# Patient Record
Sex: FEMALE | Race: WHITE | NOT HISPANIC OR LATINO | Employment: UNEMPLOYED | URBAN - METROPOLITAN AREA
[De-identification: names, ages, dates, MRNs, and addresses within clinical notes are randomized per-mention and may not be internally consistent; named-entity substitution may affect disease eponyms.]

---

## 2022-06-07 ENCOUNTER — HOSPITAL ENCOUNTER (OUTPATIENT)
Dept: RADIOLOGY | Facility: HOSPITAL | Age: 42
Discharge: HOME | End: 2022-06-07
Attending: FAMILY MEDICINE
Payer: MEDICAID

## 2022-06-07 DIAGNOSIS — Z12.31 ENCOUNTER FOR SCREENING MAMMOGRAM FOR MALIGNANT NEOPLASM OF BREAST: ICD-10-CM

## 2022-06-07 PROCEDURE — 77063 BREAST TOMOSYNTHESIS BI: CPT | Mod: 26,,, | Performed by: RADIOLOGY

## 2022-06-07 PROCEDURE — 77067 SCR MAMMO BI INCL CAD: CPT | Mod: 26,,, | Performed by: RADIOLOGY

## 2022-06-07 PROCEDURE — 77067 MAMMO DIGITAL SCREENING BILAT WITH TOMO: ICD-10-PCS | Mod: 26,,, | Performed by: RADIOLOGY

## 2022-06-07 PROCEDURE — 77063 MAMMO DIGITAL SCREENING BILAT WITH TOMO: ICD-10-PCS | Mod: 26,,, | Performed by: RADIOLOGY

## 2022-06-07 PROCEDURE — 77067 SCR MAMMO BI INCL CAD: CPT | Mod: TC

## 2022-06-16 ENCOUNTER — HOSPITAL ENCOUNTER (OUTPATIENT)
Dept: RADIOLOGY | Facility: HOSPITAL | Age: 42
Discharge: HOME | End: 2022-06-16
Attending: FAMILY MEDICINE
Payer: MEDICAID

## 2022-06-16 DIAGNOSIS — R92.8 ABNORMAL MAMMOGRAM: ICD-10-CM

## 2022-06-16 PROCEDURE — 77061 BREAST TOMOSYNTHESIS UNI: CPT | Mod: 26,LT,, | Performed by: RADIOLOGY

## 2022-06-16 PROCEDURE — 77061 BREAST TOMOSYNTHESIS UNI: CPT | Mod: TC,LT

## 2022-06-16 PROCEDURE — 76642 ULTRASOUND BREAST LIMITED: CPT | Mod: TC,LT

## 2022-06-16 PROCEDURE — 77061 MAMMO DIGITAL DIAGNOSTIC LEFT WITH TOMO: ICD-10-PCS | Mod: 26,LT,, | Performed by: RADIOLOGY

## 2022-06-16 PROCEDURE — 77065 MAMMO DIGITAL DIAGNOSTIC LEFT WITH TOMO: ICD-10-PCS | Mod: 26,LT,, | Performed by: RADIOLOGY

## 2022-06-16 PROCEDURE — 77065 DX MAMMO INCL CAD UNI: CPT | Mod: TC,LT

## 2022-06-16 PROCEDURE — 76642 US BREAST LEFT LIMITED: ICD-10-PCS | Mod: 26,LT,, | Performed by: RADIOLOGY

## 2022-06-16 PROCEDURE — 76642 ULTRASOUND BREAST LIMITED: CPT | Mod: 26,LT,, | Performed by: RADIOLOGY

## 2022-06-16 PROCEDURE — 77065 DX MAMMO INCL CAD UNI: CPT | Mod: 26,LT,, | Performed by: RADIOLOGY

## 2023-02-28 ENCOUNTER — HOSPITAL ENCOUNTER (OUTPATIENT)
Dept: RADIOLOGY | Facility: HOSPITAL | Age: 43
Discharge: HOME | End: 2023-02-28
Attending: FAMILY MEDICINE
Payer: MEDICAID

## 2023-02-28 DIAGNOSIS — M54.50 LOW BACK PAIN: ICD-10-CM

## 2023-02-28 PROCEDURE — 72100 X-RAY EXAM L-S SPINE 2/3 VWS: CPT | Mod: TC

## 2023-06-12 DIAGNOSIS — Z12.31 OTHER SCREENING MAMMOGRAM: Primary | ICD-10-CM

## 2023-06-14 ENCOUNTER — HOSPITAL ENCOUNTER (OUTPATIENT)
Dept: RADIOLOGY | Facility: HOSPITAL | Age: 43
Discharge: HOME | End: 2023-06-14
Attending: FAMILY MEDICINE
Payer: MEDICAID

## 2023-06-14 DIAGNOSIS — Z12.31 OTHER SCREENING MAMMOGRAM: ICD-10-CM

## 2023-06-14 PROCEDURE — 77067 SCR MAMMO BI INCL CAD: CPT | Mod: TC

## 2023-06-14 PROCEDURE — 77063 BREAST TOMOSYNTHESIS BI: CPT | Mod: 26,,, | Performed by: STUDENT IN AN ORGANIZED HEALTH CARE EDUCATION/TRAINING PROGRAM

## 2023-06-14 PROCEDURE — 77067 MAMMO DIGITAL SCREENING BILAT WITH TOMO: ICD-10-PCS | Mod: 26,,, | Performed by: STUDENT IN AN ORGANIZED HEALTH CARE EDUCATION/TRAINING PROGRAM

## 2023-06-14 PROCEDURE — 77067 SCR MAMMO BI INCL CAD: CPT | Mod: 26,,, | Performed by: STUDENT IN AN ORGANIZED HEALTH CARE EDUCATION/TRAINING PROGRAM

## 2023-06-14 PROCEDURE — 77063 MAMMO DIGITAL SCREENING BILAT WITH TOMO: ICD-10-PCS | Mod: 26,,, | Performed by: STUDENT IN AN ORGANIZED HEALTH CARE EDUCATION/TRAINING PROGRAM

## 2024-03-25 ENCOUNTER — HOSPITAL ENCOUNTER (EMERGENCY)
Facility: HOSPITAL | Age: 44
Discharge: HOME | End: 2024-03-26
Attending: INTERNAL MEDICINE
Payer: MEDICAID

## 2024-03-25 DIAGNOSIS — R10.9 LEFT FLANK PAIN: ICD-10-CM

## 2024-03-25 DIAGNOSIS — N20.1 URETEROLITHIASIS: Primary | ICD-10-CM

## 2024-03-25 PROCEDURE — 99285 EMERGENCY DEPT VISIT HI MDM: CPT

## 2024-03-26 VITALS
DIASTOLIC BLOOD PRESSURE: 92 MMHG | SYSTOLIC BLOOD PRESSURE: 120 MMHG | OXYGEN SATURATION: 100 % | TEMPERATURE: 98 F | BODY MASS INDEX: 38.31 KG/M2 | HEIGHT: 62 IN | WEIGHT: 208.19 LBS | RESPIRATION RATE: 16 BRPM | HEART RATE: 78 BPM

## 2024-03-26 LAB
AMPHET UR QL SCN: NEGATIVE
APPEARANCE UR: CLEAR
B-HCG SERPL QL: NEGATIVE
BACTERIA #/AREA URNS AUTO: ABNORMAL /HPF
BARBITURATE SCN PRESENT UR: NEGATIVE
BENZODIAZ UR QL SCN: NEGATIVE
BILIRUB UR QL STRIP.AUTO: NEGATIVE
CANNABINOIDS UR QL SCN: NEGATIVE
COCAINE UR QL SCN: NEGATIVE
COLOR UR AUTO: YELLOW
FENTANYL UR QL SCN: NEGATIVE
GLUCOSE UR QL STRIP.AUTO: NEGATIVE
KETONES UR QL STRIP.AUTO: ABNORMAL
LEUKOCYTE ESTERASE UR QL STRIP.AUTO: ABNORMAL
MDMA UR QL SCN: NEGATIVE
NITRITE UR QL STRIP.AUTO: NEGATIVE
OPIATES UR QL SCN: NEGATIVE
PCP UR QL: NEGATIVE
PH UR STRIP.AUTO: 6 [PH]
PH UR: 6 [PH] (ref 3–11)
PROT UR QL STRIP.AUTO: NEGATIVE
RBC #/AREA URNS AUTO: ABNORMAL /HPF
RBC UR QL AUTO: ABNORMAL
SP GR UR STRIP.AUTO: >=1.03 (ref 1–1.03)
SPECIFIC GRAVITY, URINE AUTO (.000) (OHS): >=1.03 (ref 1–1.03)
SQUAMOUS #/AREA URNS AUTO: ABNORMAL /HPF
UROBILINOGEN UR STRIP-ACNC: 0.2
WBC #/AREA URNS AUTO: ABNORMAL /HPF

## 2024-03-26 PROCEDURE — 96372 THER/PROPH/DIAG INJ SC/IM: CPT | Performed by: INTERNAL MEDICINE

## 2024-03-26 PROCEDURE — 81025 URINE PREGNANCY TEST: CPT | Performed by: INTERNAL MEDICINE

## 2024-03-26 PROCEDURE — 80307 DRUG TEST PRSMV CHEM ANLYZR: CPT | Performed by: INTERNAL MEDICINE

## 2024-03-26 PROCEDURE — 81003 URINALYSIS AUTO W/O SCOPE: CPT | Performed by: INTERNAL MEDICINE

## 2024-03-26 PROCEDURE — 63600175 PHARM REV CODE 636 W HCPCS: Performed by: INTERNAL MEDICINE

## 2024-03-26 RX ORDER — BETAMETHASONE SODIUM PHOSPHATE AND BETAMETHASONE ACETATE 3; 3 MG/ML; MG/ML
6 INJECTION, SUSPENSION INTRA-ARTICULAR; INTRALESIONAL; INTRAMUSCULAR; SOFT TISSUE
Status: COMPLETED | OUTPATIENT
Start: 2024-03-26 | End: 2024-03-26

## 2024-03-26 RX ORDER — TAMSULOSIN HYDROCHLORIDE 0.4 MG/1
0.4 CAPSULE ORAL DAILY
Qty: 10 CAPSULE | Refills: 0 | Status: SHIPPED | OUTPATIENT
Start: 2024-03-26 | End: 2024-04-05

## 2024-03-26 RX ORDER — OXYCODONE AND ACETAMINOPHEN 10; 325 MG/1; MG/1
1 TABLET ORAL EVERY 6 HOURS PRN
Qty: 20 TABLET | Refills: 0 | Status: SHIPPED | OUTPATIENT
Start: 2024-03-26 | End: 2024-03-31

## 2024-03-26 RX ORDER — KETOROLAC TROMETHAMINE 30 MG/ML
60 INJECTION, SOLUTION INTRAMUSCULAR; INTRAVENOUS
Status: COMPLETED | OUTPATIENT
Start: 2024-03-26 | End: 2024-03-26

## 2024-03-26 RX ADMIN — BETAMETHASONE SODIUM PHOSPHATE AND BETAMETHASONE ACETATE 6 MG: 3; 3 INJECTION, SUSPENSION INTRA-ARTICULAR; INTRALESIONAL; INTRAMUSCULAR at 12:03

## 2024-03-26 RX ADMIN — KETOROLAC TROMETHAMINE 60 MG: 60 INJECTION, SOLUTION INTRAMUSCULAR at 12:03

## 2024-03-26 NOTE — ED PROVIDER NOTES
Encounter Date: 3/25/2024       History     Chief Complaint   Patient presents with    Back Pain     C/o left lower back/hip pain that shoots up into her left shoulder for 1 hour     43-year-old white female with left flank pain going from her left flank up to her shoulder and down into the groin area on the left.  It woke her up from sleep she denies history of kidney stones she does have a history of sciatica      Review of patient's allergies indicates:  Not on File  History reviewed. No pertinent past medical history.  History reviewed. No pertinent surgical history.  History reviewed. No pertinent family history.  Social History     Tobacco Use    Smoking status: Every Day     Types: Cigarettes    Smokeless tobacco: Never   Substance Use Topics    Alcohol use: Not Currently    Drug use: Not Currently     Review of Systems   Constitutional: Negative.  Negative for activity change, appetite change, chills, diaphoresis, fatigue, fever and unexpected weight change.   HENT: Negative.  Negative for congestion, dental problem, drooling, ear discharge, ear pain, facial swelling, hearing loss, mouth sores, nosebleeds, postnasal drip, rhinorrhea, sinus pressure, sinus pain, sneezing, sore throat, tinnitus, trouble swallowing and voice change.    Eyes: Negative.  Negative for photophobia, pain, discharge, redness, itching and visual disturbance.   Respiratory: Negative.  Negative for apnea, cough, choking, chest tightness, shortness of breath, wheezing and stridor.    Cardiovascular: Negative.  Negative for chest pain, palpitations and leg swelling.   Gastrointestinal: Negative.  Negative for abdominal distention, abdominal pain, anal bleeding, blood in stool, constipation, diarrhea, nausea, rectal pain and vomiting.   Endocrine: Negative.  Negative for cold intolerance, heat intolerance, polydipsia, polyphagia and polyuria.   Genitourinary:  Positive for flank pain. Negative for decreased urine volume, difficulty  urinating, dyspareunia, dysuria, enuresis, frequency, genital sores, hematuria, menstrual problem, pelvic pain, urgency, vaginal bleeding, vaginal discharge and vaginal pain.   Musculoskeletal:  Positive for back pain. Negative for arthralgias, gait problem, joint swelling, myalgias, neck pain and neck stiffness.   Skin: Negative.  Negative for color change, pallor, rash and wound.   Allergic/Immunologic: Negative.  Negative for environmental allergies, food allergies and immunocompromised state.   Neurological: Negative.  Negative for dizziness, tremors, seizures, syncope, facial asymmetry, speech difficulty, weakness, light-headedness, numbness and headaches.   Hematological: Negative.  Negative for adenopathy. Does not bruise/bleed easily.   Psychiatric/Behavioral: Negative.  Negative for agitation, behavioral problems, confusion, decreased concentration, dysphoric mood, hallucinations, self-injury, sleep disturbance and suicidal ideas. The patient is not nervous/anxious and is not hyperactive.    All other systems reviewed and are negative.      Physical Exam     Initial Vitals [03/25/24 2357]   BP Pulse Resp Temp SpO2   (!) 140/85 81 (!) 22 98.1 °F (36.7 °C) 100 %      MAP       --         Physical Exam    Nursing note and vitals reviewed.  Constitutional: She appears well-developed and well-nourished. She is not diaphoretic. No distress.   Appears to be in pain   HENT:   Head: Normocephalic and atraumatic.   Mouth/Throat: Oropharynx is clear and moist. No oropharyngeal exudate.   Eyes: Conjunctivae and EOM are normal. No scleral icterus.   Neck: Neck supple. No JVD present.   Normal range of motion.  Cardiovascular:  Normal rate, regular rhythm, normal heart sounds and intact distal pulses.     Exam reveals no gallop and no friction rub.       No murmur heard.  Pulmonary/Chest: Breath sounds normal. No respiratory distress. She has no wheezes. She exhibits no tenderness.   Abdominal: Abdomen is soft. Bowel  sounds are normal. She exhibits no distension. There is no abdominal tenderness. There is no rebound.   Musculoskeletal:         General: Normal range of motion.      Cervical back: Normal range of motion and neck supple.     Neurological: She is alert and oriented to person, place, and time.   Skin: Skin is warm and dry. Capillary refill takes less than 2 seconds.   Psychiatric: She has a normal mood and affect. Her behavior is normal. Judgment and thought content normal.         ED Course   Procedures  Labs Reviewed   URINALYSIS, REFLEX TO URINE CULTURE - Abnormal; Notable for the following components:       Result Value    Ketones, UA 2+ (*)     Blood, UA 3+ (*)     Leukocyte Esterase, UA 1+ (*)     All other components within normal limits   URINALYSIS, MICROSCOPIC - Abnormal; Notable for the following components:    Bacteria, UA Few (*)     RBC, UA 11-20 (*)     Squamous Epithelial Cells, UA Moderate (*)     All other components within normal limits   PREGNANCY TEST, URINE RAPID - Normal   DRUG SCREEN, URINE (BEAKER) - Normal    Narrative:     Cut off concentrations:    Amphetamines - 1000 ng/ml  Barbiturates - 200 ng/ml  Benzodiazepine - 200 ng/ml  Cannabinoids (THC) - 50 ng/ml  Cocaine - 300 ng/ml  Fentanyl - 1.0 ng/ml  MDMA - 500 ng/ml  Opiates - 300 ng/ml   Phencyclidine (PCP) - 25 ng/ml    Specimen submitted for drug analysis and tested for pH and specific gravity in order to evaluate sample integrity. Suspect tampering if specific gravity is <1.003 and/or pH is not within the range of 4.5 - 8.0  False negatives may result form substances such as bleach added to urine.  False positives may result for the presence of a substance with similar chemical structure to the drug or its metabolite.    This test provides only a PRELIMINARY analytical test result. A more specific alternate chemical method must be used in order to obtain a confirmed analytical result. Gas chromatography/mass spectrometry (GC/MS) is  the preferred confirmatory method. Other chemical confirmation methods are available. Clinical consideration and professional judgement should be applied to any drug of abuse test result, particularly when preliminary positive results are used.    Positive results will be confirmed only at the physicians request. Unconfirmed screening results are to be used only for medical purposes (treatment).               Imaging Results              CT Renal Stone Study ABD Pelvis WO (Preliminary result)  Result time 03/26/24 01:20:12      Preliminary result by Juan Luis Lopez MD (03/26/24 01:20:12)                   Narrative:    START OF REPORT:  Technique: CT of the abdomen and pelvis was performed with axial images as well as sagittal and coronal reconstruction images without intravenous contrast.    Comparison: None available.    Clinical History: Flank pain. Left flank pain with hematuria.    Dosage Information: Automated Exposure Control was utilized 434.77 mGy.cm.    Findings:  Thorax:  Lungs: The visualized lung bases appear unremarkable.  Pleura: No effusions or thickening are seen.  Heart: The heart size is within normal limits.  Abdomen:  Abdominal Wall: There are tiny defects along the midline ventral mid abdominal wall / supraumbilical region with herniation of mesenteric fat (series 2, images 52-58).  Liver: The liver appears unremarkable.  Biliary System: No intrahepatic or extrahepatic biliary duct dilatation is seen.  Gallbladder: The gallbladder appears unremarkable.  Pancreas: The pancreas appears unremarkable.  Spleen: The spleen appears unremarkable.  Adrenals: The adrenal glands appear unremarkable.  Kidneys: The right kidney appears unremarkable with no stones cysts masses or hydronephrosis. Multiple nonobstructing bilateral renal calculi are seen in the mid pole of the right kidney and lower pole of the left kidney, the largest measures approximately 4 mm on the right. There is mild left  hydroureteronephrosis secondary to a 4 mm obstructing calculus in the terminal ureter (series 2, image 126).  Aorta: There is mild calcification of the abdominal aorta and its branches.  IVC: Unremarkable.  Bowel:  Esophagus: The visualized esophagus appears unremarkable.  Stomach: The stomach appears unremarkable.  Duodenum: Unremarkable appearing duodenum.  Small Bowel: The small bowel appears unremarkable.  Colon: Nondistended.  Appendix: The appendix appears unremarkable (series 2, images ).  Peritoneum: No intraperitoneal free air or ascites is seen.    Pelvis:  Bladder: The bladder is nondistended and cannot be definitively evaluated.  Female:  Uterus: The uterus appears unremarkable.  Ovaries: No adnexal masses are seen.    Bony structures:  Dorsal Spine: There is mild spondylosis of the visualized dorsal spine.  Bony Pelvis: The visualized bony structures of the pelvis appear unremarkable.      Impression:  1. There is mild left hydroureteronephrosis secondary to a 4 mm obstructing calculus in the terminal ureter (series 2, image 126). Follow-up as clinically indicated.  2. Details and other findings as discussed above.                                         Medications   ketorolac injection 60 mg (60 mg Intramuscular Given 3/26/24 0037)   betamethasone acetate-betamethasone sodium phosphate injection 6 mg (6 mg Intramuscular Given 3/26/24 0037)     Medical Decision Making  43-year-old white female with left flank pain going up to the left shoulder and down to left groin.  Differential diagnosis includes sciatica, back strain, musculoskeletal pain, diverticulitis, nephro lithiasis, ureterolithiasis, pyelonephritis.  Workup included urine which showed bloody urine at that point her exam was concerned that she probably had a kidney stone she could not keep still can not find a comfortable position so she was given some Toradol and Celestone as she reported she has a history of sciatica in the past.   Once her urine came back and showed some red blood cells in urine a CT with stone protocol was done which showed a 4 mm stone in the left distal ureter with mild hydronephrosis.  She was much more comfortable after the Toradol discussed treatment with pain meds and Flomax and she will follow up with the primary care if she does not passed her stone she can get referred to Urology    Problems Addressed:  Left flank pain: acute illness or injury with systemic symptoms  Ureterolithiasis: acute illness or injury with systemic symptoms    Amount and/or Complexity of Data Reviewed  Labs: ordered. Decision-making details documented in ED Course.  Radiology: ordered. Decision-making details documented in ED Course.    Risk  OTC drugs.  Prescription drug management.                                      Clinical Impression:  Final diagnoses:  [N20.1] Ureterolithiasis (Primary)  [R10.9] Left flank pain          ED Disposition Condition    Discharge Stable          ED Prescriptions       Medication Sig Dispense Start Date End Date Auth. Provider    oxyCODONE-acetaminophen (PERCOCET)  mg per tablet Take 1 tablet by mouth every 6 (six) hours as needed for Pain. 20 tablet 3/26/2024 3/31/2024 Tremaine Browning MD    tamsulosin (FLOMAX) 0.4 mg Cap Take 1 capsule (0.4 mg total) by mouth once daily. for 10 days 10 capsule 3/26/2024 4/5/2024 Tremaine Browning MD          Follow-up Information       Follow up With Specialties Details Why Contact Info    Duong Wiseman MD Family Medicine In 2 days  [address]  [address]  [place] [address] [phone]              Rubi Armijo is a certified MA and was present during the entire interaction with this patient      Tremaine Browning MD  03/26/24 6762

## 2024-08-12 ENCOUNTER — LAB VISIT (OUTPATIENT)
Dept: LAB | Facility: HOSPITAL | Age: 44
End: 2024-08-12
Attending: FAMILY MEDICINE
Payer: MEDICAID

## 2024-08-12 DIAGNOSIS — Z11.3 SCREENING EXAMINATION FOR VENEREAL DISEASE: Primary | ICD-10-CM

## 2024-08-12 PROCEDURE — 86803 HEPATITIS C AB TEST: CPT

## 2024-08-12 PROCEDURE — 86694 HERPES SIMPLEX NES ANTBDY: CPT

## 2024-08-12 PROCEDURE — 87591 N.GONORRHOEAE DNA AMP PROB: CPT

## 2024-08-12 PROCEDURE — 87340 HEPATITIS B SURFACE AG IA: CPT

## 2024-08-12 PROCEDURE — 86704 HEP B CORE ANTIBODY TOTAL: CPT

## 2024-08-12 PROCEDURE — 87491 CHLMYD TRACH DNA AMP PROBE: CPT

## 2024-08-12 PROCEDURE — 86708 HEPATITIS A ANTIBODY: CPT

## 2024-08-12 PROCEDURE — 86780 TREPONEMA PALLIDUM: CPT

## 2024-08-12 PROCEDURE — 87389 HIV-1 AG W/HIV-1&-2 AB AG IA: CPT

## 2024-08-12 PROCEDURE — 36415 COLL VENOUS BLD VENIPUNCTURE: CPT

## 2024-08-13 LAB
C TRACH DNA SPEC QL NAA+PROBE: NOT DETECTED
HAV AB SER QL IA: NONREACTIVE
HBV CORE AB SERPL QL IA: NONREACTIVE
HBV SURFACE AG SERPL QL IA: NONREACTIVE
HCV AB SERPL QL IA: NONREACTIVE
HIV 1+2 AB+HIV1 P24 AG SERPL QL IA: NONREACTIVE
N GONORRHOEA DNA SPEC QL NAA+PROBE: NOT DETECTED
SOURCE (OHS): NORMAL
T PALLIDUM AB SER QL: NONREACTIVE

## 2024-08-16 LAB — BEAKER SEE SCANNED REPORT: NORMAL

## 2024-12-02 ENCOUNTER — HOSPITAL ENCOUNTER (EMERGENCY)
Facility: HOSPITAL | Age: 44
Discharge: HOME | End: 2024-12-02
Attending: INTERNAL MEDICINE
Payer: MEDICAID

## 2024-12-02 VITALS
BODY MASS INDEX: 38.83 KG/M2 | TEMPERATURE: 99 F | HEIGHT: 62 IN | WEIGHT: 211 LBS | HEART RATE: 88 BPM | SYSTOLIC BLOOD PRESSURE: 148 MMHG | RESPIRATION RATE: 18 BRPM | DIASTOLIC BLOOD PRESSURE: 82 MMHG | OXYGEN SATURATION: 98 %

## 2024-12-02 DIAGNOSIS — N23 URETERAL COLIC: ICD-10-CM

## 2024-12-02 DIAGNOSIS — N20.0 NEPHROLITHIASIS: Primary | ICD-10-CM

## 2024-12-02 LAB
ALBUMIN SERPL-MCNC: 4.4 G/DL (ref 3.5–5)
ALBUMIN/GLOB SERPL: 1.3 RATIO (ref 1.1–2)
ALP SERPL-CCNC: 55 UNIT/L (ref 40–150)
ALT SERPL-CCNC: 16 UNIT/L (ref 0–55)
ANION GAP SERPL CALC-SCNC: 9 MEQ/L
AST SERPL-CCNC: 13 UNIT/L (ref 5–34)
B-HCG UR QL: NEGATIVE
BACTERIA #/AREA URNS AUTO: ABNORMAL /HPF
BASOPHILS # BLD AUTO: 0.03 X10(3)/MCL
BASOPHILS NFR BLD AUTO: 0.5 %
BILIRUB SERPL-MCNC: 0.4 MG/DL
BILIRUB UR QL STRIP.AUTO: NEGATIVE
BUN SERPL-MCNC: 9 MG/DL (ref 7–18.7)
CALCIUM SERPL-MCNC: 9.9 MG/DL (ref 8.4–10.2)
CHLORIDE SERPL-SCNC: 107 MMOL/L (ref 98–107)
CLARITY UR: ABNORMAL
CO2 SERPL-SCNC: 23 MMOL/L (ref 22–29)
COLOR UR AUTO: ABNORMAL
CREAT SERPL-MCNC: 0.74 MG/DL (ref 0.55–1.02)
CREAT/UREA NIT SERPL: 12
EOSINOPHIL # BLD AUTO: 0.13 X10(3)/MCL (ref 0–0.9)
EOSINOPHIL NFR BLD AUTO: 2 %
ERYTHROCYTE [DISTWIDTH] IN BLOOD BY AUTOMATED COUNT: 13 % (ref 11.5–17)
GFR SERPLBLD CREATININE-BSD FMLA CKD-EPI: >60 ML/MIN/1.73/M2
GLOBULIN SER-MCNC: 3.4 GM/DL (ref 2.4–3.5)
GLUCOSE SERPL-MCNC: 107 MG/DL (ref 74–100)
GLUCOSE UR QL STRIP: NEGATIVE
HCT VFR BLD AUTO: 43.1 % (ref 37–47)
HGB BLD-MCNC: 14.4 G/DL (ref 12–16)
HGB UR QL STRIP: ABNORMAL
IMM GRANULOCYTES # BLD AUTO: 0.01 X10(3)/MCL (ref 0–0.04)
IMM GRANULOCYTES NFR BLD AUTO: 0.2 %
KETONES UR QL STRIP: NEGATIVE
LEUKOCYTE ESTERASE UR QL STRIP: ABNORMAL
LIPASE SERPL-CCNC: 15 U/L
LYMPHOCYTES # BLD AUTO: 2.1 X10(3)/MCL (ref 0.6–4.6)
LYMPHOCYTES NFR BLD AUTO: 33 %
MCH RBC QN AUTO: 29.9 PG (ref 27–31)
MCHC RBC AUTO-ENTMCNC: 33.4 G/DL (ref 33–36)
MCV RBC AUTO: 89.6 FL (ref 80–94)
MONOCYTES # BLD AUTO: 0.53 X10(3)/MCL (ref 0.1–1.3)
MONOCYTES NFR BLD AUTO: 8.3 %
NEUTROPHILS # BLD AUTO: 3.56 X10(3)/MCL (ref 2.1–9.2)
NEUTROPHILS NFR BLD AUTO: 56 %
NITRITE UR QL STRIP: NEGATIVE
NRBC BLD AUTO-RTO: 0 %
PH UR STRIP: 5.5 [PH]
PLATELET # BLD AUTO: 323 X10(3)/MCL (ref 130–400)
PMV BLD AUTO: 9.9 FL (ref 7.4–10.4)
POTASSIUM SERPL-SCNC: 4 MMOL/L (ref 3.5–5.1)
PROT SERPL-MCNC: 7.8 GM/DL (ref 6.4–8.3)
PROT UR QL STRIP: NEGATIVE
RBC # BLD AUTO: 4.81 X10(6)/MCL (ref 4.2–5.4)
RBC #/AREA URNS AUTO: ABNORMAL /HPF
SODIUM SERPL-SCNC: 139 MMOL/L (ref 136–145)
SP GR UR STRIP.AUTO: 1.01 (ref 1–1.03)
SQUAMOUS #/AREA URNS AUTO: ABNORMAL /HPF
UROBILINOGEN UR STRIP-ACNC: 0.2
WBC # BLD AUTO: 6.36 X10(3)/MCL (ref 4.5–11.5)
WBC #/AREA URNS AUTO: ABNORMAL /HPF

## 2024-12-02 PROCEDURE — 81025 URINE PREGNANCY TEST: CPT | Performed by: INTERNAL MEDICINE

## 2024-12-02 PROCEDURE — 85025 COMPLETE CBC W/AUTO DIFF WBC: CPT | Performed by: INTERNAL MEDICINE

## 2024-12-02 PROCEDURE — 99284 EMERGENCY DEPT VISIT MOD MDM: CPT | Mod: 25

## 2024-12-02 PROCEDURE — 83690 ASSAY OF LIPASE: CPT | Performed by: INTERNAL MEDICINE

## 2024-12-02 PROCEDURE — 80053 COMPREHEN METABOLIC PANEL: CPT | Performed by: INTERNAL MEDICINE

## 2024-12-02 PROCEDURE — 81003 URINALYSIS AUTO W/O SCOPE: CPT | Performed by: INTERNAL MEDICINE

## 2024-12-02 RX ORDER — NITROFURANTOIN 25; 75 MG/1; MG/1
100 CAPSULE ORAL 2 TIMES DAILY
Qty: 20 CAPSULE | Refills: 0 | Status: SHIPPED | OUTPATIENT
Start: 2024-12-02 | End: 2024-12-12

## 2024-12-02 RX ORDER — TAMSULOSIN HYDROCHLORIDE 0.4 MG/1
0.4 CAPSULE ORAL DAILY
Qty: 10 CAPSULE | Refills: 0 | Status: SHIPPED | OUTPATIENT
Start: 2024-12-02 | End: 2025-12-02

## 2024-12-02 RX ORDER — HYDROCODONE BITARTRATE AND ACETAMINOPHEN 5; 325 MG/1; MG/1
1 TABLET ORAL EVERY 8 HOURS PRN
Qty: 6 TABLET | Refills: 0 | Status: SHIPPED | OUTPATIENT
Start: 2024-12-02 | End: 2024-12-04

## 2024-12-02 NOTE — ED PROVIDER NOTES
12/02/2024     9:42 AM  Source of History:  History obtained from the patient.     Chief complaint:  From Nurse Triage:  Flank Pain (C/o right flank pain since Saturday, hx kidney stones)    HISTORY OF PRESENT ILLNES:  Adelia Laws is a 44 y.o. female  has a past medical history of Kidney stone. presenting with Flank Pain (C/o right flank pain since Saturday, hx kidney stones)    REVIEW OF SYSTEMS:   Constitutional symptoms:  No Fever. No Chills    Skin symptoms:  No Rash.    Eye symptoms:  No Visual disturbance reported.   ENMT symptoms:  No Sore throat,    Respiratory symptoms:  No Shortness of Breath, no Cough, no Wheezing.    Cardiovascular symptoms:  No Chest Pain, No Palpitations.   Gastrointestinal symptoms:  Right lower Abdominal Pain, right flank pain, No Nausea, No Vomiting, No Diarrhea, No Constipation.    Genitourinary symptoms:  No Dysuria,    Musculoskeletal symptoms:  No Back pain,    Neurologic symptoms:  No Headache, No Dizziness.    Psychiatric symptoms:  No Anxiety, No Depression, No Substance Abuse.              Additional review of systems information: Patient Denies Any Other Complaints.    All Other Systems Reviewed With Patient And Negative.    ALLEGIES:  Review of patient's allergies indicates:   Allergen Reactions    Aspirin Other (See Comments)       MEDICINE LIST:  Current Outpatient Medications   Medication Instructions    HYDROcodone-acetaminophen (NORCO) 5-325 mg per tablet 1 tablet, Oral, Every 8 hours PRN    nitrofurantoin, macrocrystal-monohydrate, (MACROBID) 100 MG capsule 100 mg, Oral, 2 times daily    tamsulosin (FLOMAX) 0.4 mg, Oral, Daily       PMH:  As per HPI and below:    Reviewed and updated in chart.    PAST MEDICAL HISTORY:  Past Medical History:   Diagnosis Date    Kidney stone         PAST SURGICAL HISTORY:  History reviewed. No pertinent surgical history.    SOCIAL HISTORY:  Social History     Tobacco Use    Smoking status: Every Day     Types: Cigarettes     Smokeless tobacco: Never   Substance Use Topics    Alcohol use: Not Currently    Drug use: Not Currently       FAMILY HISTORY:  Family History   Problem Relation Name Age of Onset    Stroke Mother      Heart disease Mother      Hypertension Mother      Depression Father      Alcohol abuse Father      Schizophrenia Father          PROBLEM LIST:  There is no problem list on file for this patient.       PHYSICAL EXAM:      ED Triage Vitals [12/02/24 0927]   BP (!) 149/88   Pulse 79   Resp 18   Temp 98.5 °F (36.9 °C)   SpO2 98 %        Vital Signs: Reviewed As In Chart.  General:  Alert, No Cardiorespiratory Distress Noted.   Eye:  Extraocular Movements Are Intact.   ENT: Mucus membranes are moist.   Cardiovascular:  Regular Rate And Rhythm, No Murmur, No Pedal Edema.    Respiratory:  Respirations Nonlabored, No Respiratory Distress, Good Bilateral Air Entry, No Rales, No Rhonchi.    Gastrointestinal:  Soft, Non Distended, right lower quadrant Tenderness, right CVA tenderness, Normal Bowel Sounds.    Neurological:  Alert And Oriented To Person, Place, Time, And Situation, Normal Motor Observed, Normal Speech Observed.  Musculoskeletal:  No Gross Deformity Noted.     Psychiatric:  Cooperative.    MEDICAL DECISION MAKING:  Reviewed Nurses Note. Reviewed Vital Signs.   Reviewed Pertinent old records, History and updated as necessary.  Medical Decision Making    Adelia Laws is 44 y.o. female who  has a past medical history of Kidney stone. arrives in ER with c/o Flank Pain (C/o right flank pain since Saturday, hx kidney stones)    Patient has a history of kidney stones, she says she started hurting in the right flank couple of days back and got worse last night coming to the right lower quadrant now, denies any fever, denies any nausea or vomiting, denies any other complaints.        Amount and/or Complexity of Data Reviewed  Labs: ordered.              ED WORKUP FOR MEDICAL DECISION MAKING:    ED ORDERS:  Orders  Placed This Encounter   Procedures    CT Renal Stone Study ABD Pelvis WO    Urinalysis, Reflex to Urine Culture    CBC auto differential    Comprehensive metabolic panel    Lipase    CBC with Differential    Urinalysis, Microscopic    HCG Qualitative Urine    Insert Saline lock IV       ED MEDICINES:  Medications - No data to display             ED LABS ORDERED AND REVIEWED:  Admission on 12/02/2024   Component Date Value Ref Range Status    Color, UA 12/02/2024 Light-Yellow  Yellow, Light-Yellow, Dark Yellow, Estefania, Straw Final    Appearance, UA 12/02/2024 Slightly Cloudy (A)  Clear Final    Specific Gravity, UA 12/02/2024 1.010  1.005 - 1.030 Final    pH, UA 12/02/2024 5.5  5.0 - 8.5 Final    Protein, UA 12/02/2024 Negative  Negative Final    Glucose, UA 12/02/2024 Negative  Negative, Normal Final    Ketones, UA 12/02/2024 Negative  Negative Final    Blood, UA 12/02/2024 Trace-Intact (A)  Negative Final    Bilirubin, UA 12/02/2024 Negative  Negative Final    Urobilinogen, UA 12/02/2024 0.2  0.2, 1.0, Normal Final    Nitrites, UA 12/02/2024 Negative  Negative Final    Leukocyte Esterase, UA 12/02/2024 1+ (A)  Negative Final    Sodium 12/02/2024 139  136 - 145 mmol/L Final    Potassium 12/02/2024 4.0  3.5 - 5.1 mmol/L Final    Chloride 12/02/2024 107  98 - 107 mmol/L Final    CO2 12/02/2024 23  22 - 29 mmol/L Final    Glucose 12/02/2024 107 (H)  74 - 100 mg/dL Final    Blood Urea Nitrogen 12/02/2024 9.0  7.0 - 18.7 mg/dL Final    Creatinine 12/02/2024 0.74  0.55 - 1.02 mg/dL Final    Calcium 12/02/2024 9.9  8.4 - 10.2 mg/dL Final    Protein Total 12/02/2024 7.8  6.4 - 8.3 gm/dL Final    Albumin 12/02/2024 4.4  3.5 - 5.0 g/dL Final    Globulin 12/02/2024 3.4  2.4 - 3.5 gm/dL Final    Albumin/Globulin Ratio 12/02/2024 1.3  1.1 - 2.0 ratio Final    Bilirubin Total 12/02/2024 0.4  <=1.5 mg/dL Final    ALP 12/02/2024 55  40 - 150 unit/L Final    ALT 12/02/2024 16  0 - 55 unit/L Final    AST 12/02/2024 13  5 - 34 unit/L  Final    eGFR 12/02/2024 >60  mL/min/1.73/m2 Final    Anion Gap 12/02/2024 9.0  mEq/L Final    BUN/Creatinine Ratio 12/02/2024 12   Final    Lipase Level 12/02/2024 15  <=60 U/L Final    WBC 12/02/2024 6.36  4.50 - 11.50 x10(3)/mcL Final    RBC 12/02/2024 4.81  4.20 - 5.40 x10(6)/mcL Final    Hgb 12/02/2024 14.4  12.0 - 16.0 g/dL Final    Hct 12/02/2024 43.1  37.0 - 47.0 % Final    MCV 12/02/2024 89.6  80.0 - 94.0 fL Final    MCH 12/02/2024 29.9  27.0 - 31.0 pg Final    MCHC 12/02/2024 33.4  33.0 - 36.0 g/dL Final    RDW 12/02/2024 13.0  11.5 - 17.0 % Final    Platelet 12/02/2024 323  130 - 400 x10(3)/mcL Final    MPV 12/02/2024 9.9  7.4 - 10.4 fL Final    Neut % 12/02/2024 56.0  % Final    Lymph % 12/02/2024 33.0  % Final    Mono % 12/02/2024 8.3  % Final    Eos % 12/02/2024 2.0  % Final    Basophil % 12/02/2024 0.5  % Final    Lymph # 12/02/2024 2.10  0.6 - 4.6 x10(3)/mcL Final    Neut # 12/02/2024 3.56  2.1 - 9.2 x10(3)/mcL Final    Mono # 12/02/2024 0.53  0.1 - 1.3 x10(3)/mcL Final    Eos # 12/02/2024 0.13  0 - 0.9 x10(3)/mcL Final    Baso # 12/02/2024 0.03  <=0.2 x10(3)/mcL Final    IG# 12/02/2024 0.01  0 - 0.04 x10(3)/mcL Final    IG% 12/02/2024 0.2  % Final    NRBC% 12/02/2024 0.0  % Final    Bacteria, UA 12/02/2024 Rare  None Seen, Rare, Occasional /HPF Final    RBC, UA 12/02/2024 0-2  None Seen, 0-2, 3-5, 0-5 /HPF Final    WBC, UA 12/02/2024 0-5  None Seen, 0-2, 3-5, 0-5 /HPF Final    Squamous Epithelial Cells, UA 12/02/2024 Few (A)  None Seen, Rare, Occasional, Occ /HPF Final    hCG Qualitative, Urine 12/02/2024 Negative  Negative Final       RADIOLOGY STUDIES ORDERED AND REVIEWED:  Imaging Results              CT Renal Stone Study ABD Pelvis WO (Final result)  Result time 12/02/24 10:52:38      Final result by Champ Hinkle MD (12/02/24 10:52:38)                   Narrative:    EXAMINATION  CT RENAL STONE STUDY ABD PELVIS WO    CLINICAL HISTORY  Right flank pain, kidney stone  suspected;    TECHNIQUE  Non-contrast helical-acquisition CT images were obtained and multiplanar reformats accomplished by a CT technologist at a separate workstation, pushed to PACS for physician review.    COMPARISON  26 March 2024    FINDINGS  Images were reviewed in soft tissue, lung, and bone windows.    Exam quality: Inherently limited evaluation of the abdominopelvic organs and vasculature secondary to lack of IV or GI contrast.    Lines/tubes: none visualized    No acute findings of the lower thoracic cavity.    Multiple bilateral nonobstructing caliceal stones are again appreciated, with no evidence of distal radiodense urolithiasis or obstructive uropathy.  Urinary bladder, uterus, and adnexal structures are unremarkable.    By non-contrast CT appearance, the liver, pancreas, spleen, and adrenal glands are normal in appearance.  No suspicious findings of the urinary bladder or biliary tree.  There are no findings of high-grade mechanical bowel obstruction or other acute GI tract process.  The appendix is unremarkable.  No free intra-abdominal air or fluid is appreciated.  There are no drainable collections.    Aortoiliac course is nonaneurysmal, with similar scattered calcified mural plaque.  No pathologic lymph node enlargement.    No acute or destructive skeletal findings are appreciated.  Subcutaneous tissues and regional muscular structures are unremarkable.    IMPRESSION  1. Bilateral nonobstructing nephrolithiasis.  2. No convincing acute abdominopelvic process.    RADIATION DOSE  Automated tube current modulation, weight-based exposure dosing, and/or iterative reconstruction technique utilized to reach lowest reasonably achievable exposure rate.    DLP: 477 mGy*cm      Electronically signed by: Champ Hinkle  Date:    12/02/2024  Time:    10:52                                    ED Course as of 12/02/24 1103   Mon Dec 02, 2024   1100 Patient has some trace leukocyte esterase, kidney function is  normal, white count is normal, her CT scan does not show any obstructing ureteral stone, she might have passed the stone already, she does not have any hydronephrosis, she has nonobstructing stones, no evidence of any other acute pathology going on in the abdomen at this time, I am going to let her go home with a prescription of nitrofurantoin, tamsulosin and hydrocodone for a couple of days. [GQ]      ED Course User Index  [GQ] Joaquina Gupta MD            PROCEDURES PERFORMED IN ED:  Procedures    DIAGNOSTIC IMPRESSION:        ICD-10-CM ICD-9-CM   1. Nephrolithiasis  N20.0 592.0   2. Ureteral colic  N23 788.0         ED Disposition Condition    Discharge Stable               Medication List        START taking these medications      HYDROcodone-acetaminophen 5-325 mg per tablet  Commonly known as: NORCO  Take 1 tablet by mouth every 8 (eight) hours as needed.     nitrofurantoin (macrocrystal-monohydrate) 100 MG capsule  Commonly known as: MACROBID  Take 1 capsule (100 mg total) by mouth 2 (two) times daily. for 10 days     tamsulosin 0.4 mg Cap  Commonly known as: FLOMAX  Take 1 capsule (0.4 mg total) by mouth once daily.               Where to Get Your Medications        These medications were sent to [facility] [place] - [address]  [address] [place] [address]      Phone: [phone]   HYDROcodone-acetaminophen 5-325 mg per tablet  nitrofurantoin (macrocrystal-monohydrate) 100 MG capsule  tamsulosin 0.4 mg Cap          Follow-up Information       Duong Wiseman MD In 2 days.    Specialty: Family Medicine  Contact information:  [address]  [address]  [place] [address] [phone]                              ED Prescriptions       Medication Sig Dispense Start Date End Date Auth. Provider    tamsulosin (FLOMAX) 0.4 mg Cap Take 1 capsule (0.4 mg total) by mouth once daily. 10 capsule 12/2/2024 12/2/2025 Joaquina Gupta MD    HYDROcodone-acetaminophen (NORCO) 5-325 mg  per tablet Take 1 tablet by mouth every 8 (eight) hours as needed. 6 tablet 12/2/2024 12/4/2024 Joaquina Gupta MD    nitrofurantoin, macrocrystal-monohydrate, (MACROBID) 100 MG capsule Take 1 capsule (100 mg total) by mouth 2 (two) times daily. for 10 days 20 capsule 12/2/2024 12/12/2024 Joaquina Gupta MD          Follow-up Information       Follow up With Specialties Details Why Contact Info    Duong Wiseman MD Family Medicine In 2 days  [address]  [address]  [place] [address]  [phone]                 Joaquina Gupta MD  12/02/24 7198

## 2024-12-09 DIAGNOSIS — N20.0 RENAL CALCULUS: Primary | ICD-10-CM

## 2025-01-03 DIAGNOSIS — N20.0 RENAL CALCULUS: Primary | ICD-10-CM

## 2025-03-20 ENCOUNTER — HOSPITAL ENCOUNTER (OUTPATIENT)
Dept: RADIOLOGY | Facility: HOSPITAL | Age: 45
Discharge: HOME | End: 2025-03-20
Attending: FAMILY MEDICINE
Payer: MEDICAID

## 2025-03-20 DIAGNOSIS — R05.1 ACUTE COUGH: ICD-10-CM

## 2025-03-20 PROCEDURE — 71046 X-RAY EXAM CHEST 2 VIEWS: CPT | Mod: TC

## 2025-04-01 DIAGNOSIS — R05.9 COUGH: Primary | ICD-10-CM

## 2025-05-09 DIAGNOSIS — R05.9 COUGH: Primary | ICD-10-CM

## 2025-05-09 DIAGNOSIS — R05.9 COUGH, UNSPECIFIED TYPE: Primary | ICD-10-CM

## 2025-05-15 ENCOUNTER — PROCEDURE VISIT (OUTPATIENT)
Dept: RESPIRATORY THERAPY | Facility: HOSPITAL | Age: 45
End: 2025-05-15
Attending: FAMILY MEDICINE
Payer: MEDICAID

## 2025-05-15 DIAGNOSIS — R05.9 COUGH: ICD-10-CM

## 2025-05-15 LAB
DLCO SINGLE BREATH LLN: 18.11
DLCO SINGLE BREATH PRE REF: 90.1 %
DLCO SINGLE BREATH REF: 23.85
DLCOC SBVA LLN: 3.51
DLCOC SBVA REF: 5.18
DLCOC SINGLE BREATH LLN: 18.11
DLCOC SINGLE BREATH REF: 23.85
DLCOCSBVAULN: 6.85
DLCOCSINGLEBREATHULN: 29.58
DLCOSINGLEBREATHULN: 29.58
DLCOVA LLN: 3.51
DLCOVA PRE REF: 88.6 %
DLCOVA PRE: 4.59 ML/(MIN*MMHG*L) (ref 3.51–6.85)
DLCOVA REF: 5.18
DLCOVAULN: 6.85
ERVN2 LLN: -16448.98
ERVN2 PRE REF: 163.9 %
ERVN2 PRE: 1.67 L (ref -16448.98–16451.02)
ERVN2 REF: 1.02
ERVN2ULN: ABNORMAL
FEF 25 75 LLN: 1.99
FEF 25 75 PRE REF: 130.1 %
FEF 25 75 REF: 3.39
FEF2575CHANGE: -6.6 %
FET100CHANGE: 0.6 %
FEV1 FVC LLN: 71
FEV1 FVC PRE REF: 107.1 %
FEV1 FVC REF: 82
FEV1 LLN: 2.14
FEV1 PRE REF: 134.7 %
FEV1 REF: 2.71
FEV1CHANGE: -0.6 %
FEV1FVCCHANGE: -0.4 %
FRCN2 LLN: 1.75
FRCN2 PRE REF: 86.1 %
FRCN2 REF: 2.57
FRCN2ULN: 3.39
FVC LLN: 2.64
FVC PRE REF: 125.3 %
FVC REF: 3.33
FVCCHANGE: -0.2 %
ICN2REF: 1.99
IVC PRE: 4.09 L (ref 2.64–4.05)
IVC SINGLE BREATH LLN: 2.64
IVC SINGLE BREATH PRE REF: 123 %
IVC SINGLE BREATH REF: 3.33
IVCSINGLEBREATHULN: 4.05
LLN IC N2: -16448.01
PEF LLN: 4.99
PEF PRE REF: 102.1 %
PEF REF: 6.6
PEFCHANGE: -13.9 %
POST FEF 25 75: 4.12 L/S (ref 1.99–4.79)
POST FET 100: 8.54 SEC
POST FEV1 FVC: 87.06 % (ref 70.59–90.76)
POST FEV1: 3.62 L (ref 2.14–3.26)
POST FVC: 4.16 L (ref 2.64–4.05)
POST PEF: 5.8 L/S (ref 4.99–8.2)
PRE DLCO: 21.48 ML/(MIN*MMHG) (ref 18.11–29.58)
PRE FEF 25 75: 4.41 L/S (ref 1.99–4.79)
PRE FET 100: 8.49 SEC
PRE FEV1 FVC: 87.39 % (ref 70.59–90.76)
PRE FEV1: 3.64 L (ref 2.14–3.26)
PRE FRC N2: 2.22 L (ref 1.75–3.39)
PRE FVC: 4.17 L (ref 2.64–4.05)
PRE IC N2: 2.5 L (ref -16448.01–16451.99)
PRE PEF: 6.74 L/S (ref 4.99–8.2)
PRE REF IC N2: 126.1 %
RVN2 LLN: 0.98
RVN2 PRE REF: 35.3 %
RVN2 PRE: 0.55 L (ref 0.98–2.13)
RVN2 REF: 1.55
RVN2TLCN2 LLN: 24.33
RVN2TLCN2 PRE REF: 34.3 %
RVN2TLCN2 PRE: 11.62 % (ref 24.33–43.51)
RVN2TLCN2 REF: 33.92
RVN2TLCN2ULN: 43.51
RVN2ULN: 2.13
TLCN2 LLN: 3.62
TLCN2 PRE REF: 102.5 %
TLCN2 PRE: 4.72 L (ref 3.62–5.59)
TLCN2 REF: 4.6
TLCN2ULN: 5.59
ULN IC N2: ABNORMAL
VA PRE: 4.68 L (ref 4.45–4.45)
VA SINGLE BREATH PRE REF: 105.1 %
VA SINGLE BREATH REF: 4.45
VCMAXN2 LLN: 2.64
VCMAXN2 PRE REF: 125.3 %
VCMAXN2 PRE: 4.17 L (ref 2.64–4.05)
VCMAXN2 REF: 3.33
VCMAXN2ULN: 4.05

## 2025-05-15 PROCEDURE — 94727 GAS DIL/WSHOT DETER LNG VOL: CPT

## 2025-05-15 PROCEDURE — 94010 BREATHING CAPACITY TEST: CPT

## 2025-05-15 PROCEDURE — 94729 DIFFUSING CAPACITY: CPT

## 2025-05-15 PROCEDURE — 94640 AIRWAY INHALATION TREATMENT: CPT | Mod: XB

## 2025-05-15 RX ORDER — ALBUTEROL SULFATE 0.83 MG/ML
2.5 SOLUTION RESPIRATORY (INHALATION)
Status: COMPLETED | OUTPATIENT
Start: 2025-05-15 | End: 2025-05-15

## 2025-05-15 RX ADMIN — ALBUTEROL SULFATE 2.5 MG: 0.83 SOLUTION RESPIRATORY (INHALATION) at 01:05

## 2025-06-26 ENCOUNTER — OFFICE VISIT (OUTPATIENT)
Dept: UROLOGY | Facility: CLINIC | Age: 45
End: 2025-06-26
Payer: MEDICAID

## 2025-06-26 ENCOUNTER — HOSPITAL ENCOUNTER (OUTPATIENT)
Dept: RADIOLOGY | Facility: HOSPITAL | Age: 45
Discharge: HOME | End: 2025-06-26
Attending: NURSE PRACTITIONER
Payer: MEDICAID

## 2025-06-26 VITALS
WEIGHT: 206.63 LBS | SYSTOLIC BLOOD PRESSURE: 111 MMHG | OXYGEN SATURATION: 99 % | HEART RATE: 78 BPM | BODY MASS INDEX: 38.03 KG/M2 | TEMPERATURE: 98 F | HEIGHT: 62 IN | DIASTOLIC BLOOD PRESSURE: 77 MMHG

## 2025-06-26 DIAGNOSIS — N39.3 STRESS INCONTINENCE: ICD-10-CM

## 2025-06-26 DIAGNOSIS — N20.0 RENAL CALCULUS: ICD-10-CM

## 2025-06-26 DIAGNOSIS — N20.0 RENAL CALCULUS: Primary | ICD-10-CM

## 2025-06-26 LAB
BILIRUB SERPL-MCNC: NEGATIVE MG/DL
BLOOD URINE, POC: NORMAL
COLOR, POC UA: YELLOW
GLUCOSE UR QL STRIP: NEGATIVE
KETONES UR QL STRIP: NEGATIVE
LEUKOCYTE ESTERASE URINE, POC: NEGATIVE
NITRITE, POC UA: NEGATIVE
PH, POC UA: 6
PROTEIN, POC: NEGATIVE
SPECIFIC GRAVITY, POC UA: 1.03
UROBILINOGEN, POC UA: 0.2

## 2025-06-26 PROCEDURE — 81001 URINALYSIS AUTO W/SCOPE: CPT | Mod: PBBFAC | Performed by: UROLOGY

## 2025-06-26 PROCEDURE — 99214 OFFICE O/P EST MOD 30 MIN: CPT | Mod: PBBFAC,25 | Performed by: UROLOGY

## 2025-06-26 PROCEDURE — 74018 RADEX ABDOMEN 1 VIEW: CPT | Mod: TC

## 2025-06-26 RX ORDER — CETIRIZINE HYDROCHLORIDE 10 MG/1
10 TABLET ORAL 2 TIMES DAILY
COMMUNITY
Start: 2025-02-13

## 2025-06-26 NOTE — PROGRESS NOTES
CC:  Nephrolithiasis    HPI:  Adelia Laws is a 45 y.o. female seen in consultation for nephrolithiasis.  Per chart review, pt presented to [facility] ED on 12/02/24 with R flank pain.   CT ABD/Pelvis showed bilateral nonobstructing nephrolithiasis  Pt was subsequently discharged from ED with Macrobid 100 mg BID x 10 days and Flomax 0.4 mg qd  She has a history of renal stones without obstruction  Pt states she has been having urinary leaking with activity/intrabdominal pressure such as laughing, coughing, sneezing, straining, lifting heavy objects that has worsened with her last vaginal delivery several years ago. She states she has 5 children all vaginal deliveries.  Denies pain with urination, urinary frequency changes, but admits to urinary leakag    Lab Results:    Lab Results   Component Value Date    COLORU Yellow 06/26/2025    SPECGRAV 1.030 06/26/2025    PHUR 6.0 06/26/2025    WBCUR Negative 06/26/2025    NITRITE Negative 06/26/2025    PROTEINPOC Negative 06/26/2025    GLUCOSEUR Negative 06/26/2025    KETONESU Negative 06/26/2025    UROBILINOGEN 0.2 06/26/2025    BILIRUBINPOC Negative 06/26/2025    RBCUR Trace-lysed 06/26/2025         Imaging:  CT - 26 March 2024:  4 mm calculus at the left UVJ  Nonobstructing calcification in the kidney bilaterally    CT - 2 December 2024:  Multiple bilateral nonobstructing caliceal stones are again appreciated, with no evidence of distal radiodense urolithiasis or obstructive uropathy.     KUB - 24 June 2025:  Multiple bilateral nonobstructing renal stones appreciated    Data Review:  Note from referring provider, Duong Wiseman MD dated 4 December 2024.  CT    ROS:  All systems reviewed and are negative except as documented in HPI and/or Assessment and Plan.     Patient Active Problem List:     Problem List[1]     Past Medical History:  Past Medical History:   Diagnosis Date    Kidney stone         Past Surgical History:  History reviewed. No pertinent surgical history.      Family History:  Family History   Problem Relation Name Age of Onset    Stroke Mother      Heart disease Mother      Hypertension Mother      Depression Father      Alcohol abuse Father      Schizophrenia Father          Social History:  Social History     Socioeconomic History    Marital status: Legally    Tobacco Use    Smoking status: Every Day     Current packs/day: 0.50     Average packs/day: 0.5 packs/day for 24.5 years (12.2 ttl pk-yrs)     Types: Cigarettes     Start date: 2001    Smokeless tobacco: Never   Substance and Sexual Activity    Alcohol use: Not Currently    Drug use: Not Currently        Allergies:  Review of patient's allergies indicates:   Allergen Reactions    Aspirin Other (See Comments)        Objective:  Vitals:    06/26/25 1030   BP: 111/77   Pulse: 78   Temp: 98.2 °F (36.8 °C)     General:  Well developed, well nourished adult female in no acute distress  Abdomen: Soft, nontender, no masses  Extremities:  No clubbing, cyanosis, or edema  Neurologic:  Grossly intact  Musculoskeletal:  Normal tone    Assessment:  1. Renal calculus  - Ambulatory referral/consult to Urology  - POCT URINE DIPSTICK WITH MICROSCOPE, AUTOMATED  - X-Ray Abdomen AP 1 View; Future    2. Stress incontinence  - Ambulatory Referral/Consult to Physical Therapy     Plan:  KUB consistent with right renal stones that are nonobstructive  Will f/u with repeat KUB in 6 months  Pelvic floor PT referral sent in setting of stress incontinence    Follow-up tests needed:   KUB     Return appointment:  Six months with MATTY Smith MD   [facility] Resident  06/26/2025       [1]   Patient Active Problem List  Diagnosis    Cough

## 2025-06-26 NOTE — PROGRESS NOTES
I saw and evaluated the patient with the resident.  I discussed with the resident and agree with the resident's history, physical, assessment, findings and care plan as documented in the resident's note.        Dottie Wilks DO  Urology  [facility]

## 2025-06-26 NOTE — PROGRESS NOTES
Pt in Urology clinic today for referral.  [facility]  Evaluated per Dr. Sparks.  Will RTC in 6 months with KUB.  Orders for therapy services for incontinence faxed [facility].